# Patient Record
Sex: FEMALE | Race: WHITE
[De-identification: names, ages, dates, MRNs, and addresses within clinical notes are randomized per-mention and may not be internally consistent; named-entity substitution may affect disease eponyms.]

---

## 2020-02-18 ENCOUNTER — HOSPITAL ENCOUNTER (INPATIENT)
Dept: HOSPITAL 74 - FM/S | Age: 70
LOS: 2 days | Discharge: HOME HEALTH | DRG: 470 | End: 2020-02-20
Attending: ORTHOPAEDIC SURGERY | Admitting: ORTHOPAEDIC SURGERY
Payer: COMMERCIAL

## 2020-02-18 VITALS — BODY MASS INDEX: 21.3 KG/M2

## 2020-02-18 DIAGNOSIS — M16.11: Primary | ICD-10-CM

## 2020-02-18 PROCEDURE — 8E0Y0CZ ROBOTIC ASSISTED PROCEDURE OF LOWER EXTREMITY, OPEN APPROACH: ICD-10-PCS | Performed by: ORTHOPAEDIC SURGERY

## 2020-02-18 PROCEDURE — 0SR90JA REPLACEMENT OF RIGHT HIP JOINT WITH SYNTHETIC SUBSTITUTE, UNCEMENTED, OPEN APPROACH: ICD-10-PCS | Performed by: ORTHOPAEDIC SURGERY

## 2020-02-18 RX ADMIN — GABAPENTIN SCH MG: 100 CAPSULE ORAL at 21:42

## 2020-02-18 RX ADMIN — CELECOXIB ONE MG: 200 CAPSULE ORAL at 09:50

## 2020-02-18 RX ADMIN — FLUTICASONE PROPIONATE SCH: 50 SPRAY, METERED NASAL at 21:42

## 2020-02-18 RX ADMIN — ACETAMINOPHEN SCH MG: 325 TABLET ORAL at 16:51

## 2020-02-18 RX ADMIN — OXYCODONE HYDROCHLORIDE SCH MG: 10 TABLET, FILM COATED, EXTENDED RELEASE ORAL at 21:43

## 2020-02-18 RX ADMIN — DOCUSATE SODIUM,SENNOSIDES SCH TABLET: 50; 8.6 TABLET, FILM COATED ORAL at 21:43

## 2020-02-18 RX ADMIN — CEFAZOLIN SODIUM SCH MLS/HR: 2 SOLUTION INTRAVENOUS at 18:15

## 2020-02-18 NOTE — HP
[facility]ellite [facility]





- Chief Complaint


Chief Complaint: right hip pain





- Past Medical History


Allergies/Adverse Reactions: 


 Allergies











Allergy/AdvReac Type Severity Reaction Status Date / Time


 


Latex, Natural Rubber Allergy Severe Difficulty Verified 02/06/20 13:01





   Breathing  


 


No Known Drug Allergies Allergy   Verified 02/06/20 13:01














- Current Medications


Current Medications: 


 Home Medications











 Medication  Instructions  Recorded


 


Acetaminophen [Tylenol Arthritis] 650 mg PO BID 02/06/20


 


Beclomethasone Dipropionate [Qvar] 2 gm IH BID 02/06/20


 


Fluticasone Propionate 16 gm NS BID 02/06/20


 


Gabapentin 100 mg PO BID 02/06/20


 


Theophylline Anhydrous [Mendel-24] 400 mg PO DAILY 02/06/20














Satellite Physical Exam





- Physical Examination


General Appearance: Well Nourished, Well Developed, Alert & Oriented x3


ENT: Clear


Lung: Normal air movement


Extremities: Other (right hip- + ttp, decr rom ,nvi, xrays show grade 4 hip djd)


Neurological: Intact, Alert, Oriented





Satellite Impression/Plan





- Impression/Plan


Impression: right hip djd


Operative Procedure: right therese thr


Date to be Performed: 02/18/20

## 2020-02-18 NOTE — OP
Operative Note





- Note:


Operative Date: 02/18/20 (annette)


Pre-Operative Diagnosis: right hip djd


Operation: right therese thr


Post-Operative Diagnosis: Same as Pre-op


Surgeon: Krishan Dai


Assistant: Ananth Pickard


Anesthesiologist/CRNA: Stepan Potts


Anesthesia: Spinal, Local


Specimens Removed: femoral head


Estimated Blood Loss (mls): 100

## 2020-02-18 NOTE — OP
DATE OF OPERATION:  02/18/2020

 

PREOPERATIVE DIAGNOSIS:  Degenerative joint disease, right hip.

 

POSTOPERATIVE DIAGNOSIS:  Degenerative joint disease, right hip.

 

PROCEDURE:  Right total hip replacement with robotic-assisted navigation

(MAKOplasty). 

 

SURGICAL ATTENDING:  Krishan Dai MD 

 

FIRST ASSISTANT:  MED Corley 

 

ANESTHESIA:  Regional and spinal. 

 

CLOSURE:  Yakelin total hip system with a No. 7 Accolade II femoral press fit stem, a

52 Trident II press fit acetabulum with 2 screws, a standard 36-mm metallic head, No.

1 Vicryl fascia, 0 and 2-0 for subcutaneous, and a ZipLine for the skin with skin

glue.

 

ESTIMATED BLOOD LOSS:  Under 100 mL.

 

COMPLICATIONS:  None.

 

CONDITION:  To recovery room in stable condition.

 

DESCRIPTION OF PROCEDURE:  Patient was taken to the operating room on February _____, 2020.  Regional and spinal sedation were administered by the anesthesiologist.  IV

Kefzol and TXA were administered prophylactically prior to the case.  Patient was

placed in the lateral decubitus position with all prominences well padded.  Right hip

area was prepped and draped in the usual sterile fashion.  Three parallel threaded

pins were drilled between the 2 tables of the iliac crest through 3 small stab

incisions.  This was done about a few centimeters up from the ASIS.  Two of these 3

pins were fashioned to the navigation array.  Next, a 12-cm curved longitudinal

incision over the posterolateral aspect of the hip was incised.  Hemostasis achieved

with Bovie cautery.  Dissection was carried down to the level of the fascia, which

was opened the entire length of the incision.  The fibers of the gluteus laci were

spread exposing the greater trochanter.  Charnley retractor was placed _____, and

care was taken not to impale the sciatic nerve.  The checkpoint was placed in the

greater trochanter.  The checkpoint and the checkpoint over the inferior pole of the

patella were registered with the navigation device.  The short external rotators were

detached off the insertion of the greater trochanter and peeked off the capsule.  A

posterior capsulectomy was then performed.  The hip was dislocated.  The femoral head

was osteotomized at the appropriate level as directed by the navigation device. 

Anterior, posterior acetabular retractors were applied.  Circumferential debridement

of the labrum was performed exposing the rim.  The acetabulum was registered with the

navigation device first in gross fashion with 3 points, anterior, posterior, and

superiorly followed by multiple points in and around the acetabulum.  Confirmation of

excellent registration was confirmed by "popping the bubbles."  The hip was then

reamed with a 52 reamer to the appropriate depth as directed by the navigation device

and the robot achieving excellent bleeding surface hemispherical cup.  A real 52

Trident II acetabulum cup was then malleted into place using 40 degrees of

inclination and 23 degrees of aversion achieving excellent coverage and stability. 

Adjuvant fixation was performed by putting 1 anterior, 1 posterior screw.  A

polyethylene liner accepting a 36-mm head with a 10-degree lip in the superior

posterior quadrant was then clipped into place.  

 

Next, our attention directed to the femur.  The proximal femur was prepared by using

a box chisel, a canal finder followed by serial broachers until a No. 7 achieved

excellent stability and fit.  A trial reduction with a standard 36-mm head achieved

excellent stability in extension and external rotation in marked flexion, in 90

degrees of flexion with adduction and internal rotation with a positive hang test and

negative telescoping.  Limb lengths were judged to be a few millimeters longer than

the contralateral side, which was accepted secondary some disease on the

contralateral side.  The trial femoral component was removed.  The real stem was then

malleted into place.  A 36-mm metallic head was then cold welded, and the hip was

reduced.  Range of motion, stability, and limb lengths were as described earlier. 

The hip was irrigated out with copious amounts of irrigation.  Vancomycin powder was

placed in the wound.  The fascia was then closed using No. 1, 0 and 2-0 for

subcutaneous, and a zip tie was then used for the skin with skin glue followed by

Aquacel dressing.  All checkpoints and pins were then removed.  The acetabular pins

were irrigated and closed with 4-0 undyed Vicryl and glue.  Sterile Aquacel dressing

was applied to both.  Patient was placed in the supine position.  Bilateral SCDs and

an abduction pillow were applied.  Patient was awakened from anesthesia and

transferred to recovery in stable condition.  No complications.  Estimated blood loss

less than 100 mL. 

 

 

SANJUANA [id number]

DD: 02/18/2020 14:14

DT: 02/18/2020 14:50

Job #:  [id number]

## 2020-02-19 LAB
DEPRECATED RDW RBC AUTO: 13.2 % (ref 11.6–15.6)
HCT VFR BLD CALC: 32.4 % (ref 32.4–45.2)
HGB BLD-MCNC: 10.7 GM/DL (ref 10.7–15.3)
MCH RBC QN AUTO: 28.9 PG (ref 25.7–33.7)
MCHC RBC AUTO-ENTMCNC: 32.9 G/DL (ref 32–36)
MCV RBC: 87.7 FL (ref 80–96)
PLATELET # BLD AUTO: 358 K/MM3 (ref 134–434)
PMV BLD: 7.4 FL (ref 7.5–11.1)
RBC # BLD AUTO: 3.69 M/MM3 (ref 3.6–5.2)
WBC # BLD AUTO: 6.5 K/MM3 (ref 4–10.8)

## 2020-02-19 RX ADMIN — ASPIRIN 325 MG ORAL TABLET SCH MG: 325 PILL ORAL at 08:00

## 2020-02-19 RX ADMIN — THEOPHYLLINE ANHYDROUS SCH MG: 100 CAPSULE, EXTENDED RELEASE ORAL at 09:00

## 2020-02-19 RX ADMIN — GABAPENTIN SCH: 100 CAPSULE ORAL at 22:01

## 2020-02-19 RX ADMIN — ACETAMINOPHEN SCH MG: 325 TABLET ORAL at 22:01

## 2020-02-19 RX ADMIN — DOCUSATE SODIUM,SENNOSIDES SCH TABLET: 50; 8.6 TABLET, FILM COATED ORAL at 09:30

## 2020-02-19 RX ADMIN — CEFAZOLIN SODIUM SCH MLS/HR: 2 SOLUTION INTRAVENOUS at 03:29

## 2020-02-19 RX ADMIN — GABAPENTIN SCH MG: 100 CAPSULE ORAL at 10:00

## 2020-02-19 RX ADMIN — PANTOPRAZOLE SODIUM SCH MG: 40 TABLET, DELAYED RELEASE ORAL at 10:10

## 2020-02-19 RX ADMIN — ACETAMINOPHEN SCH MG: 325 TABLET ORAL at 03:36

## 2020-02-19 RX ADMIN — ACETAMINOPHEN SCH MG: 325 TABLET ORAL at 10:30

## 2020-02-19 RX ADMIN — FLUTICASONE PROPIONATE SCH: 50 SPRAY, METERED NASAL at 22:29

## 2020-02-19 RX ADMIN — ACETAMINOPHEN SCH MG: 325 TABLET ORAL at 16:40

## 2020-02-19 RX ADMIN — DOCUSATE SODIUM,SENNOSIDES SCH TABLET: 50; 8.6 TABLET, FILM COATED ORAL at 22:00

## 2020-02-19 RX ADMIN — MULTIVITAMIN TABLET SCH TAB: TABLET at 10:15

## 2020-02-19 RX ADMIN — OXYCODONE HYDROCHLORIDE SCH MG: 10 TABLET, FILM COATED, EXTENDED RELEASE ORAL at 22:00

## 2020-02-19 RX ADMIN — FLUTICASONE PROPIONATE SCH SPRAY: 50 SPRAY, METERED NASAL at 10:25

## 2020-02-19 RX ADMIN — OXYCODONE HYDROCHLORIDE SCH MG: 10 TABLET, FILM COATED, EXTENDED RELEASE ORAL at 10:15

## 2020-02-19 NOTE — PN
Progress Note (short form)





- Note


Progress Note: 





Ortho





Pt seen and examined s/p right therese thr pod #1





 Selected Entries











  02/19/20





  06:00


 


Temperature 98.4 F


 


Pulse Rate 68


 


Respiratory 20





Rate 


 


Blood Pressure 126/76








 Laboratory Tests











  02/19/20





  06:50


 


WBC  6.5


 


Hgb  10.7


 


Hct  32.4


 


Plt Count  358








dressing c/d/i, calf soft, nt


nvi





a/p


PT


hip precautions


dvt ppx


pain control


d/c planning

## 2020-02-19 NOTE — PN
Progress Note (short form)





- Note


Progress Note: 





POD #1 s/p R THR under spinal anesthesia/ paravertebral block.  Patient doing 

well, mild nausea controlled with zofran, pain tolerable.  All questions 

answered.  Physical therapy tolerated.

## 2020-02-20 VITALS — DIASTOLIC BLOOD PRESSURE: 51 MMHG | TEMPERATURE: 98.6 F | HEART RATE: 86 BPM | SYSTOLIC BLOOD PRESSURE: 113 MMHG

## 2020-02-20 LAB
DEPRECATED RDW RBC AUTO: 13.4 % (ref 11.6–15.6)
HCT VFR BLD CALC: 31.4 % (ref 32.4–45.2)
HGB BLD-MCNC: 10.9 GM/DL (ref 10.7–15.3)
MCH RBC QN AUTO: 30.4 PG (ref 25.7–33.7)
MCHC RBC AUTO-ENTMCNC: 34.7 G/DL (ref 32–36)
MCV RBC: 87.4 FL (ref 80–96)
PLATELET # BLD AUTO: 317 K/MM3 (ref 134–434)
PMV BLD: 7.8 FL (ref 7.5–11.1)
RBC # BLD AUTO: 3.59 M/MM3 (ref 3.6–5.2)
WBC # BLD AUTO: 8.1 K/MM3 (ref 4–10.8)

## 2020-02-20 RX ADMIN — GABAPENTIN SCH MG: 100 CAPSULE ORAL at 09:24

## 2020-02-20 RX ADMIN — CELECOXIB ONE: 200 CAPSULE ORAL at 08:32

## 2020-02-20 RX ADMIN — FLUTICASONE PROPIONATE SCH SPRAY: 50 SPRAY, METERED NASAL at 09:25

## 2020-02-20 RX ADMIN — THEOPHYLLINE ANHYDROUS SCH MG: 100 CAPSULE, EXTENDED RELEASE ORAL at 09:24

## 2020-02-20 RX ADMIN — ASPIRIN 325 MG ORAL TABLET SCH MG: 325 PILL ORAL at 08:31

## 2020-02-20 RX ADMIN — ACETAMINOPHEN SCH MG: 325 TABLET ORAL at 05:22

## 2020-02-20 RX ADMIN — MULTIVITAMIN TABLET SCH TAB: TABLET at 09:25

## 2020-02-20 RX ADMIN — PANTOPRAZOLE SODIUM SCH MG: 40 TABLET, DELAYED RELEASE ORAL at 09:25

## 2020-02-20 RX ADMIN — DOCUSATE SODIUM,SENNOSIDES SCH TABLET: 50; 8.6 TABLET, FILM COATED ORAL at 09:24

## 2020-02-20 RX ADMIN — ACETAMINOPHEN SCH: 325 TABLET ORAL at 11:01

## 2020-02-20 RX ADMIN — OXYCODONE HYDROCHLORIDE SCH MG: 10 TABLET, FILM COATED, EXTENDED RELEASE ORAL at 09:25

## 2020-02-20 NOTE — DS
Physical Examination


Vital Signs: 


 Vital Signs











Temperature  98.1 F   02/20/20 06:00


 


Pulse Rate  96 H  02/20/20 06:00


 


Respiratory Rate  18   02/20/20 06:00


 


Blood Pressure  111/58 L  02/20/20 06:00


 


O2 Sat by Pulse Oximetry (%)  95   02/20/20 01:36











Labs: 


 CBC, BMP





 02/19/20 06:50 











Discharge Summary


Problems reviewed: Yes


Reason For Visit: OSTEOARTHRITIS


Procedures: Principal: right thr


Hospital Course: 


admitted for elective right therese thr, post-op per protocol, stable for d/c


Condition: Good





- Instructions


Diet, Activity, Other Instructions: 


Post-op Instructions-Total Hip Replacement                                     

              





     Call the office for a follow-up  appointment in 1 week - [phone]


     Aspirin 325mg daily for 6 weeks. Pain medication was sent into your 

pharmacy.                      


     Apply  Graduated Compression Stockings (TEDs) to both lower extremities-

remove daily


      for hygiene  ONLY


     Apply Sequential Compression Device (SCDs)  to both Lower extremities 

remove for PT 


      and hygiene ONLY  


     Apply cold packs to affected area for 15 minutes every 2 hours.


     Physical Therapist will come to your home for the first 5 days. You will 

be set up with 


      outpatient PT at your first post-operative visit.


     Patient may ambulate as tolerated-encourage self care (at least every 2-3 

hours while awake) 


      with walker or cane


     Maintain Aquacel (waterproof) dressing to operative wound (will be 

removed by surgeon at 


      first office visit)


     Shower with Aquacel dressing in place-if Aquacel integrity compromised, 

remove and apply 


      dry sterile dressing and notify Orthopedist.  DO NOT SHOWER unless 

Orthopedists approves without Aquacel 


      dressing





     CONTACT THE OFFICE FOR ANY CHANGE IN YOUR CONDITION (for example-fever 


      greater than 102 degrees, excessive bleeding from operative site, 

purulent drainage, 


      severe swelling or pain)    GO TO THE EMERGENCY ROOM IF THERE IS A  

MEDICAL EMERGENCY





     Hip Precautions:  


      * Keep a rolled towel under affected heel while in bed or chair (to keep 

knee 


        in extension)


      * Dependent upon approach:


            * Posterior - do not cross legs; do not sit on low chairs or 

toilets.





  * If you have any questions, please do not hesitate to call the office - [phone].


Referrals: 


Krishan Dai MD [Staff Physician] - 





- Home Medications


Comprehensive Discharge Medication List: 


Ambulatory Orders





Acetaminophen [Tylenol Arthritis] 650 mg PO BID 02/06/20 


Beclomethasone Dipropionate [Qvar] 2 gm IH BID 02/06/20 


Fluticasone Propionate 16 gm NS BID 02/06/20 


Gabapentin 100 mg PO BID 02/06/20 


Theophylline Anhydrous [Mendel-24] 400 mg PO DAILY 02/06/20 


Oxycodone HCl/Acetaminophen [Percocet 5-325 mg Tablet -] 1 - 2 tab PO Q6H #50 

tab MDD 8 02/18/20

## 2020-02-20 NOTE — PN
Progress Note (short form)





- Note


Progress Note: 





Ortho





Pt seen and examined s/p right therese thr pod #2


 Selected Entries











  02/20/20





  06:00


 


Temperature 98.1 F


 


Pulse Rate 96 H


 


Respiratory 18





Rate 


 


Blood Pressure 111/58 L











dressing c/d/i, calf soft, nt


nvi





cbc pending





a/p


PT


hip precautions


dvt ppx


pain control


d/c home today


f/u in 1 week

## 2021-09-25 ENCOUNTER — HOSPITAL ENCOUNTER (EMERGENCY)
Dept: HOSPITAL 74 - JER | Age: 71
Discharge: HOME | End: 2021-09-25
Payer: COMMERCIAL

## 2021-09-25 VITALS — HEART RATE: 87 BPM | DIASTOLIC BLOOD PRESSURE: 89 MMHG | SYSTOLIC BLOOD PRESSURE: 158 MMHG

## 2021-09-25 VITALS — BODY MASS INDEX: 20 KG/M2

## 2021-09-25 VITALS — TEMPERATURE: 97.9 F
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
## 2021-09-25 DIAGNOSIS — S01.319A: Primary | ICD-10-CM
